# Patient Record
Sex: FEMALE | Race: BLACK OR AFRICAN AMERICAN | NOT HISPANIC OR LATINO | Employment: FULL TIME | URBAN - METROPOLITAN AREA
[De-identification: names, ages, dates, MRNs, and addresses within clinical notes are randomized per-mention and may not be internally consistent; named-entity substitution may affect disease eponyms.]

---

## 2020-11-04 ENCOUNTER — HOSPITAL ENCOUNTER (OUTPATIENT)
Dept: LAB | Facility: MEDICAL CENTER | Age: 30
End: 2020-11-04
Payer: COMMERCIAL

## 2020-11-05 LAB
COVID ORDER STATUS COVID19: NORMAL
SARS-COV-2 RNA RESP QL NAA+PROBE: NOTDETECTED
SPECIMEN SOURCE: NORMAL

## 2021-09-10 ENCOUNTER — HOSPITAL ENCOUNTER (OUTPATIENT)
Dept: LAB | Facility: MEDICAL CENTER | Age: 31
End: 2021-09-10
Attending: FAMILY MEDICINE
Payer: COMMERCIAL

## 2021-09-10 LAB
ALBUMIN SERPL BCP-MCNC: 3.9 G/DL (ref 3.2–4.9)
ALBUMIN/GLOB SERPL: 1.1 G/DL
ALP SERPL-CCNC: 59 U/L (ref 30–99)
ALT SERPL-CCNC: 14 U/L (ref 2–50)
AMORPH CRY #/AREA URNS HPF: PRESENT /HPF
ANION GAP SERPL CALC-SCNC: 11 MMOL/L (ref 7–16)
APPEARANCE UR: ABNORMAL
AST SERPL-CCNC: 16 U/L (ref 12–45)
BACTERIA #/AREA URNS HPF: ABNORMAL /HPF
BASOPHILS # BLD AUTO: 0.6 % (ref 0–1.8)
BASOPHILS # BLD: 0.04 K/UL (ref 0–0.12)
BILIRUB SERPL-MCNC: <0.2 MG/DL (ref 0.1–1.5)
BILIRUB UR QL STRIP.AUTO: NEGATIVE
BUN SERPL-MCNC: 8 MG/DL (ref 8–22)
CALCIUM SERPL-MCNC: 9.3 MG/DL (ref 8.5–10.5)
CAOX CRY #/AREA URNS HPF: ABNORMAL /HPF
CHLORIDE SERPL-SCNC: 105 MMOL/L (ref 96–112)
CHOLEST SERPL-MCNC: 146 MG/DL (ref 100–199)
CO2 SERPL-SCNC: 21 MMOL/L (ref 20–33)
COLOR UR: ABNORMAL
CREAT SERPL-MCNC: 0.65 MG/DL (ref 0.5–1.4)
EOSINOPHIL # BLD AUTO: 0.03 K/UL (ref 0–0.51)
EOSINOPHIL NFR BLD: 0.5 % (ref 0–6.9)
EPI CELLS #/AREA URNS HPF: ABNORMAL /HPF
ERYTHROCYTE [DISTWIDTH] IN BLOOD BY AUTOMATED COUNT: 44.9 FL (ref 35.9–50)
EST. AVERAGE GLUCOSE BLD GHB EST-MCNC: 91 MG/DL
GLOBULIN SER CALC-MCNC: 3.4 G/DL (ref 1.9–3.5)
GLUCOSE SERPL-MCNC: 88 MG/DL (ref 65–99)
GLUCOSE UR STRIP.AUTO-MCNC: NEGATIVE MG/DL
HBA1C MFR BLD: 4.8 % (ref 4–5.6)
HCT VFR BLD AUTO: 40.2 % (ref 37–47)
HDLC SERPL-MCNC: 49 MG/DL
HGB BLD-MCNC: 13 G/DL (ref 12–16)
IMM GRANULOCYTES # BLD AUTO: 0.01 K/UL (ref 0–0.11)
IMM GRANULOCYTES NFR BLD AUTO: 0.2 % (ref 0–0.9)
KETONES UR STRIP.AUTO-MCNC: ABNORMAL MG/DL
LDLC SERPL CALC-MCNC: 87 MG/DL
LEUKOCYTE ESTERASE UR QL STRIP.AUTO: NEGATIVE
LYMPHOCYTES # BLD AUTO: 1.12 K/UL (ref 1–4.8)
LYMPHOCYTES NFR BLD: 17.6 % (ref 22–41)
MCH RBC QN AUTO: 31.1 PG (ref 27–33)
MCHC RBC AUTO-ENTMCNC: 32.3 G/DL (ref 33.6–35)
MCV RBC AUTO: 96.2 FL (ref 81.4–97.8)
MICRO URNS: ABNORMAL
MONOCYTES # BLD AUTO: 0.25 K/UL (ref 0–0.85)
MONOCYTES NFR BLD AUTO: 3.9 % (ref 0–13.4)
NEUTROPHILS # BLD AUTO: 4.9 K/UL (ref 2–7.15)
NEUTROPHILS NFR BLD: 77.2 % (ref 44–72)
NITRITE UR QL STRIP.AUTO: NEGATIVE
NRBC # BLD AUTO: 0 K/UL
NRBC BLD-RTO: 0 /100 WBC
PH UR STRIP.AUTO: 5.5 [PH] (ref 5–8)
PLATELET # BLD AUTO: 429 K/UL (ref 164–446)
PMV BLD AUTO: 9.4 FL (ref 9–12.9)
POTASSIUM SERPL-SCNC: 3.8 MMOL/L (ref 3.6–5.5)
PROT SERPL-MCNC: 7.3 G/DL (ref 6–8.2)
PROT UR QL STRIP: 30 MG/DL
RBC # BLD AUTO: 4.18 M/UL (ref 4.2–5.4)
RBC # URNS HPF: ABNORMAL /HPF
RBC UR QL AUTO: NEGATIVE
SODIUM SERPL-SCNC: 137 MMOL/L (ref 135–145)
SP GR UR STRIP.AUTO: 1.04
TRIGL SERPL-MCNC: 50 MG/DL (ref 0–149)
TSH SERPL DL<=0.005 MIU/L-ACNC: 0.87 UIU/ML (ref 0.38–5.33)
UROBILINOGEN UR STRIP.AUTO-MCNC: 0.2 MG/DL
WBC # BLD AUTO: 6.4 K/UL (ref 4.8–10.8)
WBC #/AREA URNS HPF: ABNORMAL /HPF

## 2021-09-10 PROCEDURE — 84402 ASSAY OF FREE TESTOSTERONE: CPT

## 2021-09-10 PROCEDURE — 81001 URINALYSIS AUTO W/SCOPE: CPT

## 2021-09-10 PROCEDURE — 36415 COLL VENOUS BLD VENIPUNCTURE: CPT

## 2021-09-10 PROCEDURE — 85025 COMPLETE CBC W/AUTO DIFF WBC: CPT

## 2021-09-10 PROCEDURE — 84443 ASSAY THYROID STIM HORMONE: CPT

## 2021-09-10 PROCEDURE — 80061 LIPID PANEL: CPT

## 2021-09-10 PROCEDURE — 80053 COMPREHEN METABOLIC PANEL: CPT

## 2021-09-10 PROCEDURE — 83036 HEMOGLOBIN GLYCOSYLATED A1C: CPT

## 2021-09-15 LAB — TESTOST FREE SERPL-MCNC: 3.8 PG/ML (ref 1.3–9.2)

## 2021-11-10 ENCOUNTER — HOSPITAL ENCOUNTER (OUTPATIENT)
Dept: LAB | Facility: MEDICAL CENTER | Age: 31
End: 2021-11-10
Attending: FAMILY MEDICINE
Payer: COMMERCIAL

## 2021-11-10 LAB
ABO GROUP BLD: NORMAL
BLD GP AB SCN SERPL QL: NORMAL
RH BLD: NORMAL

## 2021-11-10 PROCEDURE — 36415 COLL VENOUS BLD VENIPUNCTURE: CPT

## 2021-11-10 PROCEDURE — 86850 RBC ANTIBODY SCREEN: CPT

## 2021-11-10 PROCEDURE — 86901 BLOOD TYPING SEROLOGIC RH(D): CPT

## 2021-11-10 PROCEDURE — 86900 BLOOD TYPING SEROLOGIC ABO: CPT

## 2021-11-10 PROCEDURE — 83021 HEMOGLOBIN CHROMOTOGRAPHY: CPT

## 2021-11-13 LAB
HGB A1 MFR BLD: 96.4 % (ref 95–97.9)
HGB A2 MFR BLD: 3.2 % (ref 2–3.5)
HGB C MFR BLD: 0 % (ref 0–0)
HGB E MFR BLD: 0 % (ref 0–0)
HGB F MFR BLD: 0.4 % (ref 0–2.1)
HGB FRACT BLD ELPH-IMP: NORMAL
HGB OTHER MFR BLD: 0 % (ref 0–0)
HGB S BLD QL SOLY: NORMAL
HGB S MFR BLD: 0 % (ref 0–0)
PATH INTERP BLD-IMP: NORMAL

## 2021-12-20 ENCOUNTER — OFFICE VISIT (OUTPATIENT)
Dept: URGENT CARE | Facility: PHYSICIAN GROUP | Age: 31
End: 2021-12-20
Payer: COMMERCIAL

## 2021-12-20 VITALS
SYSTOLIC BLOOD PRESSURE: 106 MMHG | RESPIRATION RATE: 20 BRPM | WEIGHT: 182.4 LBS | BODY MASS INDEX: 30.39 KG/M2 | HEIGHT: 65 IN | OXYGEN SATURATION: 97 % | HEART RATE: 81 BPM | TEMPERATURE: 98.1 F | DIASTOLIC BLOOD PRESSURE: 68 MMHG

## 2021-12-20 DIAGNOSIS — H60.392 ACUTE INFECTION OF LEFT EXTERNAL EAR: ICD-10-CM

## 2021-12-20 DIAGNOSIS — H66.92 ACUTE BACTERIAL INFECTION OF LEFT MIDDLE EAR: ICD-10-CM

## 2021-12-20 PROCEDURE — 99203 OFFICE O/P NEW LOW 30 MIN: CPT | Performed by: NURSE PRACTITIONER

## 2021-12-20 RX ORDER — CIPROFLOXACIN AND DEXAMETHASONE 3; 1 MG/ML; MG/ML
4 SUSPENSION/ DROPS AURICULAR (OTIC) 2 TIMES DAILY
Qty: 7.5 ML | Refills: 0 | Status: SHIPPED | OUTPATIENT
Start: 2021-12-20 | End: 2021-12-27

## 2021-12-20 RX ORDER — AMOXICILLIN 500 MG/1
500 CAPSULE ORAL 2 TIMES DAILY
Qty: 20 CAPSULE | Refills: 0 | Status: SHIPPED | OUTPATIENT
Start: 2021-12-20 | End: 2021-12-30

## 2021-12-20 ASSESSMENT — FIBROSIS 4 INDEX: FIB4 SCORE: 0.33

## 2021-12-22 ASSESSMENT — ENCOUNTER SYMPTOMS
CHILLS: 0
SORE THROAT: 0
FEVER: 0
HEADACHES: 0
MYALGIAS: 0
NAUSEA: 0
COUGH: 0

## 2021-12-22 ASSESSMENT — LIFESTYLE VARIABLES: SUBSTANCE_ABUSE: 0

## 2021-12-22 NOTE — PROGRESS NOTES
"Beryl Strong is a 31 y.o. female who presents for Otalgia (pain left ear,x1.5 days)      HPI This is a new problem.  Beryl is a 30 y/o female with c/o severe left ear pain.  Pain is 8/10, throbbing, sharp. Treatment tried: warm pack, tylenol. Helps some. Denies tinnitus, ear drainage, fever, chills. No change in hearing.  No recent illess. No other aggravating or alleviating factors.       Review of Systems   Constitutional: Negative for chills, fever and malaise/fatigue.   HENT: Positive for ear pain. Negative for congestion and sore throat.    Respiratory: Negative for cough.    Gastrointestinal: Negative for nausea.   Musculoskeletal: Negative for myalgias.   Neurological: Negative for headaches.   Endo/Heme/Allergies: Negative for environmental allergies.   Psychiatric/Behavioral: Negative for substance abuse.       Allergies:     No Known Allergies    PMSFS Hx:  History reviewed. No pertinent past medical history.  History reviewed. No pertinent surgical history.  History reviewed. No pertinent family history.  Social History     Tobacco Use   • Smoking status: Never Smoker   • Smokeless tobacco: Never Used   Substance Use Topics   • Alcohol use: Never       Problems:   There is no problem list on file for this patient.      Medications:   No current outpatient medications on file prior to visit.     No current facility-administered medications on file prior to visit.          Objective:     /68 (BP Location: Right arm, Patient Position: Sitting, BP Cuff Size: Adult)   Pulse 81   Temp 36.7 °C (98.1 °F) (Temporal)   Resp 20   Ht 1.651 m (5' 5\")   Wt 82.7 kg (182 lb 6.4 oz)   SpO2 97%   BMI 30.35 kg/m²     Physical Exam  Vitals and nursing note reviewed.   Constitutional:       General: She is not in acute distress.     Appearance: Normal appearance. She is well-developed and normal weight.   HENT:      Head: Normocephalic.      Right Ear: Hearing normal. Tympanic membrane is injected. Tympanic " membrane is not erythematous.      Left Ear: Hearing, ear canal and external ear normal. A middle ear effusion is present. Tympanic membrane is erythematous and bulging.      Nose: Congestion present.      Mouth/Throat:      Mouth: Mucous membranes are moist.   Eyes:      Conjunctiva/sclera: Conjunctivae normal.   Cardiovascular:      Rate and Rhythm: Normal rate and regular rhythm.      Pulses: Normal pulses.      Heart sounds: Normal heart sounds.   Pulmonary:      Effort: Pulmonary effort is normal. No respiratory distress.      Breath sounds: Normal breath sounds.   Chest:   Breasts:      Right: No supraclavicular adenopathy.      Left: No supraclavicular adenopathy.       Musculoskeletal:         General: Normal range of motion.      Cervical back: Normal range of motion and neck supple.   Lymphadenopathy:      Head:      Right side of head: No tonsillar adenopathy.      Left side of head: No tonsillar adenopathy.      Cervical: No cervical adenopathy.      Upper Body:      Right upper body: No supraclavicular adenopathy.      Left upper body: No supraclavicular adenopathy.   Skin:     General: Skin is warm and dry.      Capillary Refill: Capillary refill takes less than 2 seconds.   Neurological:      Mental Status: She is alert and oriented to person, place, and time.      Deep Tendon Reflexes: Reflexes are normal and symmetric.   Psychiatric:         Mood and Affect: Mood normal.         Speech: Speech normal.         Behavior: Behavior normal.         Thought Content: Thought content normal.         Assessment /Associated Orders:      1. Acute infection of left external ear  ciprofloxacin/dexamethasone (CIPRODEX) 0.3-0.1 % Suspension    amoxicillin (AMOXIL) 500 MG Cap   2. Acute bacterial infection of left middle ear  amoxicillin (AMOXIL) 500 MG Cap       Medical Decision Making:    Pt is clinically stable at today's acute urgent care visit.  No acute distress noted. Appropriate for outpatient management at  this time.   Acute problem today with uncertain prognosis.   Educated in proper administration of medication(s) ordered today including safety, possible SE, risks, benefits, rationale and alternatives to therapy.   OTC  analgesic of choice (acetaminophen or NSAID). Follow manufactures dosing and safety precautions.   Warm packs prn pain       Advised to follow-up with the primary care provider for recheck, reevaluation, and consideration of further management if necessary.   Discussed management options (risks,benefits, and alternatives to treatment). Expressed understanding and the treatment plan was agreed upon. Questions were encouraged and answered   Return to urgent care prn if new or worsening sx or if there is no improvement in condition prn.    Educated in Red flags and indications to immediately call 911 or present to the Emergency Department.     I personally reviewed prior external notes and test results pertinent to today's visit.  I have independently reviewed and interpreted all diagnostics ordered during this urgent care acute visit.   Time spent evaluating this patient was at least 30 minutes and includes preparing for visit, counseling/education, exam and evaluation, obtaining history, independent interpretation, ordering lab/test/procedures,medication management and documentation.Time does not include separately billable procedures noted .